# Patient Record
Sex: FEMALE | Race: WHITE | Employment: FULL TIME | URBAN - METROPOLITAN AREA
[De-identification: names, ages, dates, MRNs, and addresses within clinical notes are randomized per-mention and may not be internally consistent; named-entity substitution may affect disease eponyms.]

---

## 2020-11-07 ENCOUNTER — APPOINTMENT (OUTPATIENT)
Dept: GENERAL RADIOLOGY | Facility: CLINIC | Age: 30
End: 2020-11-07
Attending: EMERGENCY MEDICINE
Payer: COMMERCIAL

## 2020-11-07 ENCOUNTER — HOSPITAL ENCOUNTER (EMERGENCY)
Facility: CLINIC | Age: 30
Discharge: HOME | End: 2020-11-07
Attending: EMERGENCY MEDICINE | Admitting: EMERGENCY MEDICINE
Payer: COMMERCIAL

## 2020-11-07 VITALS
RESPIRATION RATE: 16 BRPM | HEART RATE: 87 BPM | OXYGEN SATURATION: 96 % | DIASTOLIC BLOOD PRESSURE: 74 MMHG | SYSTOLIC BLOOD PRESSURE: 115 MMHG | TEMPERATURE: 99.1 F

## 2020-11-07 DIAGNOSIS — S83.91XA SPRAIN OF RIGHT KNEE, UNSPECIFIED LIGAMENT, INITIAL ENCOUNTER: ICD-10-CM

## 2020-11-07 PROCEDURE — 29505 APPLICATION LONG LEG SPLINT: CPT | Mod: RT

## 2020-11-07 PROCEDURE — 73562 X-RAY EXAM OF KNEE 3: CPT | Mod: RT

## 2020-11-07 PROCEDURE — 99284 EMERGENCY DEPT VISIT MOD MDM: CPT | Mod: 25

## 2020-11-07 ASSESSMENT — ENCOUNTER SYMPTOMS: ARTHRALGIAS: 1

## 2020-11-07 NOTE — ED AVS SNAPSHOT
[facility] Emergency Dept  [address]  [facility] [address]  Phone: [phone]  Fax: [phone]                                    Cecily Gonzalez   MRN: [id number]    Department: [facility] Emergency Dept   Date of Visit: 11/7/2020           After Visit Summary Signature Page    I have received my discharge instructions, and my questions have been answered. I have discussed any challenges I see with this plan with the nurse or doctor.    ..........................................................................................................................................  Patient/Patient Representative Signature      ..........................................................................................................................................  Patient Representative Print Name and Relationship to Patient    ..................................................               ................................................  Date                                   Time    ..........................................................................................................................................  Reviewed by Signature/Title    ...................................................              ..............................................  Date                                               Time          22EPIC Rev 08/18

## 2020-11-08 NOTE — ED PROVIDER NOTES
History     Chief Complaint:  Knee Pain       HPI  Cecily Gonzalez is a 30 year old year old female with a history of asthma who presents for evaluation of knee pain. The patient was playing soccer when someone ran through her right knee and caused her immediate pain to the outside of her knee. She then sat out and iced the knee. She has not had any previous knee surgeries or injuries. She said the knee feels unstable like her calf is not attached. She notes it is painful just to sit there but does not hurt more when pushed on.    Allergies:  No Known Drug Allergies    Medications:   Medications reviewed. No pertinent medications.    Medical History:   Asthma    Surgical History:   Surgical history reviewed. No pertinent surgical history.    Social History:  Smoking Status: Negative   Smokeless Tobacco: Negative   Alcohol Use: Negative   Drug Use: Negative     Review of Systems   Musculoskeletal: Positive for arthralgias (right knee).   All other systems reviewed and are negative.      Physical Exam     Patient Vitals for the past 24 hrs:   BP Temp Temp src Pulse Resp SpO2   11/07/20 2202 115/74 99.1  F (37.3  C) Temporal 87 16 96 %          Physical Exam  General: Adult female sitting uprigh  CV: DP and PT pulses intact RLE.  Resp:  Normal respiratory effort.  MSK: No edema. Nontender. No crepitus or bony deformityHolds the right knee in full extension. Increased pain laterally over right knee with flexion.  Skin: Warm and dry. No rashes or lesions or ecchymoses on visible skin.  Neuro: Alert and oriented. Responds appropriately to all questions and commands. No focal findings appreciated. Normal muscle tone. Sensation intact to light touch over all dermatomes of the RLE.  Psych: Normal mood and affect. Pleasant.    Emergency Department Course     Imaging:  Radiology results were communicated with the patient who voiced understanding of the findings.    XR Knee Right 3 Views  No visible fracture or  dislocation.    Reading per radiology     Emergency Department Course:    2222 Nursing notes and vitals reviewed.    2230 I performed an exam of the patient as documented above.     2237 The patient was sent for XR while in the emergency department, results above.     Knee immobilizer placed by technician.     2258 Findings and plan explained to the Patient. Patient discharged home with instructions regarding supportive care, medications, and reasons to return. The importance of close follow-up was reviewed. The patient was prescribed as below.    Impression & Plan       Medical Decision Making:  Cecily Gonzalez is a 30 year old female who presents for evaluation of the traumatic right knee.  Her exam findings are noted above, most pertinent is no redness, warmth to knee making septic arthritis and/or crystal disease of joint very unlikely.  Signs and symptoms are consistent with a knee sprain or strain.  A broad differential was also considered including sprain, strain, fracture, tendon rupture, nerve impingement/compromise, referred pain. Supportive outpatient management is indicated.  Rest, ice, and elevation treatment was discussed with the patient. The patient denies any other trauma, injury or pain. Close follow-up with orthopedics (recommended to call clinic on as early possible after discharge to set up appointment). Sprain discharge instructions given.      Diagnosis:     ICD-10-CM    1. Sprain of right knee, unspecified ligament, initial encounter  S83.91XA         Disposition:  Discharged to home.      Scribe Disclosure:  I, Jojo Wong, am serving as a scribe at 10:31 PM on 11/7/2020 to document services personally performed by Mihaela Villa MD based on my observations and the provider's statements to me.      Mihaela Villa MD  11/09/20 3187

## 2020-11-08 NOTE — ED TRIAGE NOTES
Patient reports she was playing soccer when opponent hit into R knee. Now c/o pain to lateral side of knee    Denies numbness or tingling. Able to bear minimal weight on that foot

## 2020-11-11 ENCOUNTER — TRANSFERRED RECORDS (OUTPATIENT)
Dept: HEALTH INFORMATION MANAGEMENT | Facility: CLINIC | Age: 30
End: 2020-11-11

## 2020-12-23 ENCOUNTER — TRANSFERRED RECORDS (OUTPATIENT)
Dept: HEALTH INFORMATION MANAGEMENT | Facility: CLINIC | Age: 30
End: 2020-12-23

## 2021-02-03 ENCOUNTER — TRANSFERRED RECORDS (OUTPATIENT)
Dept: HEALTH INFORMATION MANAGEMENT | Facility: CLINIC | Age: 31
End: 2021-02-03